# Patient Record
Sex: MALE | Race: WHITE
[De-identification: names, ages, dates, MRNs, and addresses within clinical notes are randomized per-mention and may not be internally consistent; named-entity substitution may affect disease eponyms.]

---

## 2018-05-27 ENCOUNTER — HOSPITAL ENCOUNTER (EMERGENCY)
Dept: HOSPITAL 17 - NEPC | Age: 73
LOS: 1 days | Discharge: HOME | End: 2018-05-28
Payer: OTHER GOVERNMENT

## 2018-05-27 VITALS
OXYGEN SATURATION: 99 % | DIASTOLIC BLOOD PRESSURE: 81 MMHG | HEART RATE: 55 BPM | RESPIRATION RATE: 16 BRPM | SYSTOLIC BLOOD PRESSURE: 168 MMHG

## 2018-05-27 VITALS
DIASTOLIC BLOOD PRESSURE: 71 MMHG | HEART RATE: 55 BPM | TEMPERATURE: 97.7 F | OXYGEN SATURATION: 98 % | RESPIRATION RATE: 16 BRPM | SYSTOLIC BLOOD PRESSURE: 152 MMHG

## 2018-05-27 VITALS
DIASTOLIC BLOOD PRESSURE: 78 MMHG | HEART RATE: 46 BPM | OXYGEN SATURATION: 96 % | RESPIRATION RATE: 16 BRPM | SYSTOLIC BLOOD PRESSURE: 172 MMHG

## 2018-05-27 VITALS
SYSTOLIC BLOOD PRESSURE: 172 MMHG | OXYGEN SATURATION: 98 % | RESPIRATION RATE: 16 BRPM | DIASTOLIC BLOOD PRESSURE: 78 MMHG | HEART RATE: 59 BPM

## 2018-05-27 VITALS — WEIGHT: 170.35 LBS | HEIGHT: 66 IN | BODY MASS INDEX: 27.38 KG/M2

## 2018-05-27 DIAGNOSIS — R61: ICD-10-CM

## 2018-05-27 DIAGNOSIS — E03.9: ICD-10-CM

## 2018-05-27 DIAGNOSIS — R55: ICD-10-CM

## 2018-05-27 DIAGNOSIS — R42: Primary | ICD-10-CM

## 2018-05-27 LAB
ALBUMIN SERPL-MCNC: 4 GM/DL (ref 3.4–5)
ALP SERPL-CCNC: 82 U/L (ref 45–117)
ALT SERPL-CCNC: 40 U/L (ref 12–78)
AST SERPL-CCNC: 25 U/L (ref 15–37)
BASOPHILS # BLD AUTO: 0.1 TH/MM3 (ref 0–0.2)
BASOPHILS NFR BLD: 0.4 % (ref 0–2)
BILIRUB SERPL-MCNC: 0.4 MG/DL (ref 0.2–1)
BUN SERPL-MCNC: 15 MG/DL (ref 7–18)
CALCIUM SERPL-MCNC: 9 MG/DL (ref 8.5–10.1)
CHLORIDE SERPL-SCNC: 104 MEQ/L (ref 98–107)
COLOR UR: (no result)
CREAT SERPL-MCNC: 1.15 MG/DL (ref 0.6–1.3)
EOSINOPHIL # BLD: 0.7 TH/MM3 (ref 0–0.4)
EOSINOPHIL NFR BLD: 4.9 % (ref 0–4)
ERYTHROCYTE [DISTWIDTH] IN BLOOD BY AUTOMATED COUNT: 14.5 % (ref 11.6–17.2)
GFR SERPLBLD BASED ON 1.73 SQ M-ARVRAT: 63 ML/MIN (ref 89–?)
GLUCOSE SERPL-MCNC: 114 MG/DL (ref 74–106)
GLUCOSE UR STRIP-MCNC: (no result) MG/DL
HCO3 BLD-SCNC: 26.1 MEQ/L (ref 21–32)
HCT VFR BLD CALC: 44.6 % (ref 39–51)
HGB BLD-MCNC: 14.8 GM/DL (ref 13–17)
HGB UR QL STRIP: (no result)
INR PPP: 1 RATIO
KETONES UR STRIP-MCNC: 10 MG/DL
LYMPHOCYTES # BLD AUTO: 2 TH/MM3 (ref 1–4.8)
LYMPHOCYTES NFR BLD AUTO: 13.9 % (ref 9–44)
MAGNESIUM SERPL-MCNC: 2.3 MG/DL (ref 1.5–2.5)
MCH RBC QN AUTO: 28.8 PG (ref 27–34)
MCHC RBC AUTO-ENTMCNC: 33.1 % (ref 32–36)
MCV RBC AUTO: 86.8 FL (ref 80–100)
MONOCYTE #: 0.7 TH/MM3 (ref 0–0.9)
MONOCYTES NFR BLD: 5.1 % (ref 0–8)
MUCOUS THREADS #/AREA URNS LPF: (no result) /LPF
NEUTROPHILS # BLD AUTO: 11.1 TH/MM3 (ref 1.8–7.7)
NEUTROPHILS NFR BLD AUTO: 75.7 % (ref 16–70)
NITRITE UR QL STRIP: (no result)
PLATELET # BLD: 225 TH/MM3 (ref 150–450)
PMV BLD AUTO: 8.9 FL (ref 7–11)
PROT SERPL-MCNC: 7.8 GM/DL (ref 6.4–8.2)
PROTHROMBIN TIME: 10.5 SEC (ref 9.8–11.6)
RBC # BLD AUTO: 5.14 MIL/MM3 (ref 4.5–5.9)
SODIUM SERPL-SCNC: 140 MEQ/L (ref 136–145)
SP GR UR STRIP: 1.01 (ref 1–1.03)
TROPONIN I SERPL-MCNC: (no result) NG/ML (ref 0.02–0.05)
URINE LEUKOCYTE ESTERASE: (no result)
WBC # BLD AUTO: 14.6 TH/MM3 (ref 4–11)

## 2018-05-27 PROCEDURE — 85610 PROTHROMBIN TIME: CPT

## 2018-05-27 PROCEDURE — 85730 THROMBOPLASTIN TIME PARTIAL: CPT

## 2018-05-27 PROCEDURE — 83735 ASSAY OF MAGNESIUM: CPT

## 2018-05-27 PROCEDURE — 70450 CT HEAD/BRAIN W/O DYE: CPT

## 2018-05-27 PROCEDURE — 99285 EMERGENCY DEPT VISIT HI MDM: CPT

## 2018-05-27 PROCEDURE — 84484 ASSAY OF TROPONIN QUANT: CPT

## 2018-05-27 PROCEDURE — 71045 X-RAY EXAM CHEST 1 VIEW: CPT

## 2018-05-27 PROCEDURE — 96374 THER/PROPH/DIAG INJ IV PUSH: CPT

## 2018-05-27 PROCEDURE — 81001 URINALYSIS AUTO W/SCOPE: CPT

## 2018-05-27 PROCEDURE — 83880 ASSAY OF NATRIURETIC PEPTIDE: CPT

## 2018-05-27 PROCEDURE — 82550 ASSAY OF CK (CPK): CPT

## 2018-05-27 PROCEDURE — 85025 COMPLETE CBC W/AUTO DIFF WBC: CPT

## 2018-05-27 PROCEDURE — 96375 TX/PRO/DX INJ NEW DRUG ADDON: CPT

## 2018-05-27 PROCEDURE — 96361 HYDRATE IV INFUSION ADD-ON: CPT

## 2018-05-27 PROCEDURE — 80053 COMPREHEN METABOLIC PANEL: CPT

## 2018-05-27 NOTE — RADRPT
EXAM DATE:  5/27/2018 9:23 PM EDT

AGE/SEX:        72 years / Male



INDICATIONS:  Palpitations



CLINICAL DATA:  This is the patient's initial encounter. Patient reports that signs and symptoms have
 been present for 1 day and indicates a pain score of 0/10. 

                                                                          

MEDICAL/SURGICAL HISTORY:       None. None.



COMPARISON:      No prior [facility] exams available for comparison.



FINDINGS:  

A single AP view of the chest demonstrates the lungs to be symmetrically aerated without evidence of 
mass, infiltrate or effusion.  The cardiomediastinal contours are unremarkable.  Osseous structures a
re intact.  





CONCLUSION: 

Negative examination.



Electronically signed by: Wilfrido Canales MD  5/27/2018 9:27 PM EDT

## 2018-05-27 NOTE — PD
HPI


Chief Complaint:  GI Complaint


Time Seen by Provider:  21:05


Travel History


International Travel<30 days:  No


Contact w/Intl Traveler<30days:  No


Traveled to known affect area:  No





History of Present Illness


HPI


72-year-old male presents to the emergency department from [facility] by EMS 

transport for near syncopal episode.  Patient states he was sitting during a 

[facility] service and suddenly started developing dizziness nausea and diaphoresis 

with near syncopal episode.  Patient states he was placed supine by 

parishioners and became more nauseated was placed upright without loss of 

consciousness no focal upper or lower extremity numbness tingling or weakness 

just generalized weakness.  Patient denies chest pain shortness of breath 

palpitations.  Patient denies any change in mentation or speech.  Patient was 

given IV fluids and Zofran in route to the hospital.  Upon arrival to the 

hospital patient states he felt slightly better.  Patient has history of ADD 

hyperlipidemia and hypothyroidism.  Patient has been taking his medications as 

prescribed.  Patient's had no recent febrile illness or respiratory illness.  

Patient denies any visual disturbance diplopia or loss of vision.  No headache.

  Patient denies chest pain.  Patient denies abdominal pain.  Patient denies 

flank pain.





PFSH


Past Medical History


*** Narrative Medical


ADD, dyslipidemia, hypothyroidism; no tobacco use; nursing notes reviewed


Heart Rhythm Problems:  No


Cardiac Catheterization:  No


Cardiovascular Problems:  No


High Cholesterol:  Yes


Congestive Heart Failure:  No


Diabetes:  No


Inguinal Hernia:  Yes (umbilical hernia repair)


Thyroid Disease:  Yes





Past Surgical History


Mastectomy:  Yes (left partial)


Tonsillectomy:  Yes





Family History


Family Myocardial Infarction:  Yes





Social History


Alcohol Use:  Yes (occ)


Tobacco Use:  No


Substance Use:  No





Allergies-Medications


(Allergen,Severity, Reaction):  


Coded Allergies:  


     No Known Allergies (Unverified , 5/27/18)


Reported Meds & Prescriptions





Reported Meds & Active Scripts


Active


Meclizine (Meclizine HCl) 25 Mg Tab 25 Mg PO Q6HR PRN


Zofran Odt (Ondansetron Odt) 4 Mg Tab 4 Mg SL Q6HR PRN


Reported


Levothyroxine (Levothyroxine Sodium) 100 Mcg Tab 100 Mcg PO DAILY


Simvastatin 40 Mg Tab 40 Mg PO DAILY


Adderall (Amphetamine-Dextroamphetamine) 10 Mg Tab 10 Mg PO BID


     Avoid late evening doses. Space doses at least 4 to 6 hours if more


     than once/day dosing.








Review of Systems


Except as stated in HPI:  all other systems reviewed are Neg


General / Constitutional:  No: Fever, Chills


Eyes:  No: Diploplia, Blurred Vision, Photophobia, Visual changes


HENT:  Positive: Vertigo, Lightheadedness, No: Headaches, Congestion


Cardiovascular:  Positive: Diaphoresis, Syncope, No: Chest Pain or Discomfort, 

Palpitations


Respiratory:  No: Cough, Shortness of Breath (Near syncope)


Gastrointestinal:  Positive: Nausea, Vomiting, No: Abdominal Pain


Genitourinary:  No: Dysuria, Flank Pain


Musculoskeletal:  No: Myalgias, Arthralgias


Skin:  No Rash


Neurologic:  Positive: Weakness, Dizziness, Syncope, No: Focal Abnormalities, 

Coordination Problem, Headache (Near syncope), Change in Mentation, Slurred 

Speech, Paresthesia, Incontinence, Seizures


Psychiatric:  Positive: Anxiety, No: Depression


Endocrine:  No: Heat Intolerance, Cold Intolerance


Hematologic/Lymphatic:  No: Easy Bruising





Physical Exam


Narrative


GENERAL: Well-developed well-nourished male no acute distress or respiratory 

distress; GCS 15


SKIN: Warm and dry.


HEAD: Atraumatic. Normocephalic. 


EYES: Pupils equal and round reactive to light. No scleral icterus. No 

injection or drainage. 


ENT: No nasal bleeding or discharge.  Mucous membranes pink and moist.


NECK: Trachea midline. No JVD. 


CARDIOVASCULAR: Decreased regular rate and rhythm.  


RESPIRATORY: No accessory muscle use. Clear to auscultation. Breath sounds 

equal bilaterally. 


GASTROINTESTINAL: Abdomen soft, non-tender, nondistended. Hepatic and splenic 

margins not palpable. 


MUSCULOSKELETAL: Extremities without clubbing, cyanosis, or edema. No obvious 

deformities. 


NEUROLOGICAL: Awake and alert. No obvious cranial nerve deficits.  Motor 

grossly within normal limits. Five out of 5 muscle strength in the arms and 

legs.  No limb ataxia.  No pronator drift.  Sensory exam intact.  No clonus 

DTRs normal speech.


PSYCHIATRIC: Appropriate mood and affect; insight and judgment normal.





Data


Data


Last Documented VS





Vital Signs








  Date Time  Temp Pulse Resp B/P (MAP) Pulse Ox O2 Delivery O2 Flow Rate FiO2


 


5/28/18 03:48        


 


5/28/18 01:19  87 16  96 Room Air  


 


5/27/18 20:41 97.7       








Orders





 Orders


Metoclopramide Inj (Reglan Inj) (5/27/18 21:15)


Complete Blood Count With Diff (5/27/18 21:05)


Comprehensive Metabolic Panel (5/27/18 21:05)


Magnesium (Mg) (5/27/18 21:05)


B-Type Natriuretic Peptide (5/27/18 21:05)


Ckmb (Isoenzyme) Profile (5/27/18 21:05)


Troponin I (5/27/18 21:05)


Act Partial Throm Time (Ptt) (5/27/18 21:05)


Prothrombin Time / Inr (Pt) (5/27/18 21:05)


Urinalysis - C+S If Indicated (5/27/18 21:05)


Chest, Single Ap (5/27/18 21:05)


Ct Brain W/O Iv Contrast(Rout) (5/27/18 21:05)


Ecg Monitoring (5/27/18 21:05)


Iv Access Insert/Monitor (5/27/18 21:05)


Oximetry (5/27/18 21:05)


Sodium Chloride 0.9% Flush (Ns Flush) (5/27/18 21:15)


Sodium Chlor 0.9% 1000 Ml Inj (Ns 1000 M (5/27/18 21:05)


Ondansetron Inj (Zofran Inj) (5/27/18 22:00)


Meclizine (Antivert) (5/27/18 23:30)


Troponin I (5/28/18 01:18)


Ed Discharge Order (5/28/18 03:42)





Labs





Laboratory Tests








Test


  5/27/18


21:49 5/27/18


23:21 5/28/18


01:28


 


White Blood Count 14.6 TH/MM3   


 


Red Blood Count 5.14 MIL/MM3   


 


Hemoglobin 14.8 GM/DL   


 


Hematocrit 44.6 %   


 


Mean Corpuscular Volume 86.8 FL   


 


Mean Corpuscular Hemoglobin 28.8 PG   


 


Mean Corpuscular Hemoglobin


Concent 33.1 % 


  


  


 


 


Red Cell Distribution Width 14.5 %   


 


Platelet Count 225 TH/MM3   


 


Mean Platelet Volume 8.9 FL   


 


Neutrophils (%) (Auto) 75.7 %   


 


Lymphocytes (%) (Auto) 13.9 %   


 


Monocytes (%) (Auto) 5.1 %   


 


Eosinophils (%) (Auto) 4.9 %   


 


Basophils (%) (Auto) 0.4 %   


 


Neutrophils # (Auto) 11.1 TH/MM3   


 


Lymphocytes # (Auto) 2.0 TH/MM3   


 


Monocytes # (Auto) 0.7 TH/MM3   


 


Eosinophils # (Auto) 0.7 TH/MM3   


 


Basophils # (Auto) 0.1 TH/MM3   


 


CBC Comment DIFF FINAL   


 


Differential Comment    


 


Prothrombin Time 10.5 SEC   


 


Prothromb Time International


Ratio 1.0 RATIO 


  


  


 


 


Activated Partial


Thromboplast Time 26.0 SEC 


  


  


 


 


Blood Urea Nitrogen 15 MG/DL   


 


Creatinine 1.15 MG/DL   


 


Random Glucose 114 MG/DL   


 


Total Protein 7.8 GM/DL   


 


Albumin 4.0 GM/DL   


 


Calcium Level 9.0 MG/DL   


 


Magnesium Level 2.3 MG/DL   


 


Alkaline Phosphatase 82 U/L   


 


Aspartate Amino Transf


(AST/SGOT) 25 U/L 


  


  


 


 


Alanine Aminotransferase


(ALT/SGPT) 40 U/L 


  


  


 


 


Total Bilirubin 0.4 MG/DL   


 


Sodium Level 140 MEQ/L   


 


Potassium Level 4.0 MEQ/L   


 


Chloride Level 104 MEQ/L   


 


Carbon Dioxide Level 26.1 MEQ/L   


 


Anion Gap 10 MEQ/L   


 


Estimat Glomerular Filtration


Rate 63 ML/MIN 


  


  


 


 


Total Creatine Kinase 66 U/L   


 


Troponin I


  LESS THAN 0.02


NG/ML 


  LESS THAN 0.02


NG/ML


 


B-Type Natriuretic Peptide 10 PG/ML   


 


Urine Color  LIGHT-YELLOW  


 


Urine Turbidity  CLEAR  


 


Urine pH  7.0  


 


Urine Specific Gravity  1.012  


 


Urine Protein  NEG mg/dL  


 


Urine Glucose (UA)  NEG mg/dL  


 


Urine Ketones  10 mg/dL  


 


Urine Occult Blood  NEG  


 


Urine Nitrite  NEG  


 


Urine Bilirubin  NEG  


 


Urine Urobilinogen


  


  LESS THAN 2.0


MG/DL 


 


 


Urine Leukocyte Esterase  NEG  


 


Urine WBC  1 /hpf  


 


Urine Mucus  FEW /lpf  


 


Microscopic Urinalysis Comment


  


  CULT NOT


INDICATED 


 











MDM


Medical Decision Making


Medical Screen Exam Complete:  Yes


Emergency Medical Condition:  Yes


Medical Record Reviewed:  Yes


Interpretation(s)


EKG sinus bradycardia first-degree AV block rate 50 no acute ST elevation 

injury pattern or ectopy





Vital Signs








  Date Time  Temp Pulse Resp B/P (MAP) Pulse Ox O2 Delivery O2 Flow Rate FiO2


 


5/27/18 20:41 97.7 55 16 152/71 98 98   








Last Impressions








Head CT 5/27/18 2105 Signed





Impressions: 





 CONCLUSION:





 1.  Negative CT Head non contrast.





 2.  No evidence of acute infarct, hemorrhage, mass or edema.





  





 


 


Chest X-Ray 5/27/18 2105 Signed





Impressions: 





 CONCLUSION: 





 Negative examination.





  





 








Differential Diagnosis


 near syncope, syncope, arrhythmia, vertigo, labyrinthitis, TIA, CVA, viral 

syndrome, electrolyte disturbance, ICH


Narrative Course


Patient placed on cardiac monitor IV access obtained specimens collected and 

sent for resulting normal saline bolus administered


Called the patient's bedside is bradycardic with heart rate of 44 complaining 

of increased nausea without diaphoresis normotensive patient placed partially 

supine repeat blood pressure remains normotensive and heart rate is returning 

to normal range patient administered Reglan 10 mg IV had received Zofran 4 mg 

in route





At 11:28 PM patient reports that he feels well rating his cell phone spouse 

waiting at bedside patient reports only when he tries to a supine or tilted 

backward as he noted the dizziness and denies any nausea at this time.  Patient'

s vital signs are stable only mild bradycardia heart rate of 58.





At 1:17 AM patient feels well after meclizine symptoms have resolved no 

dizziness no lightheadedness and no nausea.  Will obtain second troponin and 

give patient trial of ambulation about the emergency department to determine 

outpatient disposition.





 @ 3:40 patient remains asymptomatic after meclizine and wishes to go home --

appears stable for outpatient management.





Diagnosis





 Primary Impression:  


 Vertigo


 Additional Impression:  


 Vasovagal near-syncope


Referrals:  


Primary Care Physician


2 days


Patient Instructions:  General Instructions





***Additional Instructions:  


Increase fluid hydration


Follow-up with primary care provider


No driving vehicle or climbing ladders or swimming while taking medication for 

dizziness


Continue current medications as presently prescribed


Return the emergency department for any concerns or change in condition


***Med/Other Pt SpecificInfo:  Prescription(s) given


Scripts


Meclizine (Meclizine) 25 Mg Tab


25 MG PO Q6HR Y for VERTIGO, #15 TAB 0 Refills


   Prov: Erna Quiñonez MD         5/28/18 


Ondansetron Odt (Zofran Odt) 4 Mg Tab


4 MG SL Q6HR Y for Nausea/Vomiting, #10 TAB 0 Refills


   Prov: Erna Quiñonez MD         5/28/18


Disposition:  01 DISCHARGE HOME


Condition:  Stable











Erna Quiñonez MD May 27, 2018 22:16

## 2018-05-27 NOTE — RADRPT
EXAM DATE:  5/27/2018 9:46 PM EDT

AGE/SEX:        72 years / Male



INDICATIONS:  Dizziness.



CLINICAL DATA:  This is the patient's initial encounter. Patient reports that signs and symptoms have
 been present for 1 day and indicates a pain score of 4/10. 

                                                                          

MEDICAL/SURGICAL HISTORY:   . Umbilical hernia  Mastectomy, left.



RADIATION DOSE:  56.35 CTDI (mGy)







COMPARISON:      No prior [facility] exams available for comparison.





TECHNIQUE:  CT of the head without contrast.  Using automated exposure control and adjustment of the 
mA and/or kV according to patient size, radiation dose was kept as low as reasonably achievable to ob
tain optimal diagnostic quality images.



FINDINGS: 

Cerebrum:  The ventricles are normal for age.  No evidence of midline shift, mass lesion, hemorrhage 
or acute infarction.  No extraaxial fluid collections are seen.

Posterior Fossa:  The cerebellum and brainstem are intact.  The 4th ventricle is midline.  The cerebe
llopontine angle is unremarkable.

Extracranial:  The visualized portion of the orbits is intact.

Skull:  The calvaria is intact.  No evidence of skull fracture.







CONCLUSION:

1.  Negative CT Head non contrast.

2.  No evidence of acute infarct, hemorrhage, mass or edema.



Electronically signed by: Wilfrido Canales MD  5/27/2018 9:47 PM EDT

## 2018-05-28 VITALS
RESPIRATION RATE: 16 BRPM | OXYGEN SATURATION: 96 % | DIASTOLIC BLOOD PRESSURE: 77 MMHG | SYSTOLIC BLOOD PRESSURE: 143 MMHG | HEART RATE: 87 BPM